# Patient Record
Sex: FEMALE | Race: WHITE
[De-identification: names, ages, dates, MRNs, and addresses within clinical notes are randomized per-mention and may not be internally consistent; named-entity substitution may affect disease eponyms.]

---

## 2023-02-06 ENCOUNTER — HOSPITAL ENCOUNTER (OUTPATIENT)
Dept: HOSPITAL 54 - WOU | Age: 83
Discharge: HOME HEALTH | End: 2023-02-06
Attending: PODIATRIST
Payer: MEDICARE

## 2023-02-06 DIAGNOSIS — L97.518: ICD-10-CM

## 2023-02-06 DIAGNOSIS — I70.235: Primary | ICD-10-CM

## 2023-02-06 DIAGNOSIS — M79.671: ICD-10-CM

## 2023-02-06 DIAGNOSIS — E11.52: ICD-10-CM

## 2023-02-06 DIAGNOSIS — I96: ICD-10-CM

## 2023-02-06 DIAGNOSIS — Z79.84: ICD-10-CM

## 2023-02-06 DIAGNOSIS — Z79.82: ICD-10-CM

## 2023-02-06 PROCEDURE — G0463 HOSPITAL OUTPT CLINIC VISIT: HCPCS

## 2023-02-13 ENCOUNTER — HOSPITAL ENCOUNTER (OUTPATIENT)
Dept: HOSPITAL 54 - WOU | Age: 83
Discharge: HOME HEALTH | End: 2023-02-13
Attending: PODIATRIST
Payer: MEDICARE

## 2023-02-13 DIAGNOSIS — Z79.84: ICD-10-CM

## 2023-02-13 DIAGNOSIS — I70.221: ICD-10-CM

## 2023-02-13 DIAGNOSIS — L97.518: ICD-10-CM

## 2023-02-13 DIAGNOSIS — I96: ICD-10-CM

## 2023-02-13 DIAGNOSIS — I70.235: Primary | ICD-10-CM

## 2023-02-13 DIAGNOSIS — E11.52: ICD-10-CM

## 2023-02-13 DIAGNOSIS — M79.671: ICD-10-CM

## 2023-02-13 PROCEDURE — G0463 HOSPITAL OUTPT CLINIC VISIT: HCPCS

## 2023-02-20 ENCOUNTER — HOSPITAL ENCOUNTER (OUTPATIENT)
Dept: HOSPITAL 54 - WOU | Age: 83
Discharge: HOME HEALTH | End: 2023-02-20
Attending: PODIATRIST
Payer: MEDICARE

## 2023-02-20 DIAGNOSIS — I70.261: ICD-10-CM

## 2023-02-20 DIAGNOSIS — L97.518: ICD-10-CM

## 2023-02-20 DIAGNOSIS — Z79.84: ICD-10-CM

## 2023-02-20 DIAGNOSIS — E11.52: Primary | ICD-10-CM

## 2023-02-20 PROCEDURE — G0463 HOSPITAL OUTPT CLINIC VISIT: HCPCS

## 2023-02-23 ENCOUNTER — HOSPITAL ENCOUNTER (OUTPATIENT)
Dept: HOSPITAL 54 - WOU | Age: 83
Discharge: HOME HEALTH | End: 2023-02-23
Attending: PODIATRIST
Payer: MEDICARE

## 2023-02-23 DIAGNOSIS — M79.671: ICD-10-CM

## 2023-02-23 DIAGNOSIS — Z79.82: ICD-10-CM

## 2023-02-23 DIAGNOSIS — L97.528: ICD-10-CM

## 2023-02-23 DIAGNOSIS — Z79.84: ICD-10-CM

## 2023-02-23 DIAGNOSIS — I70.235: ICD-10-CM

## 2023-02-23 DIAGNOSIS — E11.52: Primary | ICD-10-CM

## 2023-02-23 DIAGNOSIS — I96: ICD-10-CM

## 2023-02-23 DIAGNOSIS — I70.221: ICD-10-CM

## 2023-02-23 PROCEDURE — G0463 HOSPITAL OUTPT CLINIC VISIT: HCPCS

## 2023-02-27 ENCOUNTER — HOSPITAL ENCOUNTER (INPATIENT)
Dept: HOSPITAL 54 - ER | Age: 83
LOS: 7 days | DRG: 981 | End: 2023-03-06
Attending: NURSE PRACTITIONER | Admitting: NURSE PRACTITIONER
Payer: MEDICARE

## 2023-02-27 ENCOUNTER — HOSPITAL ENCOUNTER (OUTPATIENT)
Dept: HOSPITAL 54 - WOU | Age: 83
Discharge: HOME HEALTH | End: 2023-02-27
Attending: PODIATRIST
Payer: MEDICARE

## 2023-02-27 VITALS — DIASTOLIC BLOOD PRESSURE: 70 MMHG | SYSTOLIC BLOOD PRESSURE: 127 MMHG

## 2023-02-27 VITALS — WEIGHT: 150.25 LBS | BODY MASS INDEX: 28.37 KG/M2 | HEIGHT: 61 IN

## 2023-02-27 VITALS — DIASTOLIC BLOOD PRESSURE: 75 MMHG | SYSTOLIC BLOOD PRESSURE: 127 MMHG

## 2023-02-27 DIAGNOSIS — J96.01: ICD-10-CM

## 2023-02-27 DIAGNOSIS — Z79.899: ICD-10-CM

## 2023-02-27 DIAGNOSIS — I96: ICD-10-CM

## 2023-02-27 DIAGNOSIS — E88.09: ICD-10-CM

## 2023-02-27 DIAGNOSIS — E83.51: ICD-10-CM

## 2023-02-27 DIAGNOSIS — Z79.82: ICD-10-CM

## 2023-02-27 DIAGNOSIS — E78.00: ICD-10-CM

## 2023-02-27 DIAGNOSIS — M79.671: ICD-10-CM

## 2023-02-27 DIAGNOSIS — J90: ICD-10-CM

## 2023-02-27 DIAGNOSIS — I70.221: ICD-10-CM

## 2023-02-27 DIAGNOSIS — I50.33: ICD-10-CM

## 2023-02-27 DIAGNOSIS — Z95.5: ICD-10-CM

## 2023-02-27 DIAGNOSIS — J98.11: ICD-10-CM

## 2023-02-27 DIAGNOSIS — L97.528: ICD-10-CM

## 2023-02-27 DIAGNOSIS — Z66: ICD-10-CM

## 2023-02-27 DIAGNOSIS — Z20.822: ICD-10-CM

## 2023-02-27 DIAGNOSIS — I82.611: ICD-10-CM

## 2023-02-27 DIAGNOSIS — F09: ICD-10-CM

## 2023-02-27 DIAGNOSIS — Z53.8: ICD-10-CM

## 2023-02-27 DIAGNOSIS — E43: ICD-10-CM

## 2023-02-27 DIAGNOSIS — D64.9: ICD-10-CM

## 2023-02-27 DIAGNOSIS — I80.8: ICD-10-CM

## 2023-02-27 DIAGNOSIS — I25.10: ICD-10-CM

## 2023-02-27 DIAGNOSIS — I25.2: ICD-10-CM

## 2023-02-27 DIAGNOSIS — F03.90: ICD-10-CM

## 2023-02-27 DIAGNOSIS — I82.621: ICD-10-CM

## 2023-02-27 DIAGNOSIS — I48.91: ICD-10-CM

## 2023-02-27 DIAGNOSIS — H54.62: ICD-10-CM

## 2023-02-27 DIAGNOSIS — Z79.84: ICD-10-CM

## 2023-02-27 DIAGNOSIS — E11.52: ICD-10-CM

## 2023-02-27 DIAGNOSIS — I11.0: ICD-10-CM

## 2023-02-27 DIAGNOSIS — J15.9: Primary | ICD-10-CM

## 2023-02-27 DIAGNOSIS — E11.52: Primary | ICD-10-CM

## 2023-02-27 DIAGNOSIS — Z79.2: ICD-10-CM

## 2023-02-27 DIAGNOSIS — I70.235: ICD-10-CM

## 2023-02-27 LAB
ALBUMIN SERPL BCP-MCNC: 1.7 G/DL (ref 3.4–5)
ALP SERPL-CCNC: 108 U/L (ref 46–116)
ALT SERPL W P-5'-P-CCNC: 17 U/L (ref 12–78)
AST SERPL W P-5'-P-CCNC: 34 U/L (ref 15–37)
BASOPHILS # BLD AUTO: 0 K/UL (ref 0–0.2)
BASOPHILS NFR BLD AUTO: 0.3 % (ref 0–2)
BILIRUB DIRECT SERPL-MCNC: 0.1 MG/DL (ref 0–0.2)
BILIRUB SERPL-MCNC: 0.2 MG/DL (ref 0.2–1)
BUN SERPL-MCNC: 15 MG/DL (ref 7–18)
CALCIUM SERPL-MCNC: 7.8 MG/DL (ref 8.5–10.1)
CHLORIDE SERPL-SCNC: 106 MMOL/L (ref 98–107)
CO2 SERPL-SCNC: 28 MMOL/L (ref 21–32)
CREAT SERPL-MCNC: 0.5 MG/DL (ref 0.6–1.3)
EOSINOPHIL NFR BLD AUTO: 0.3 % (ref 0–6)
GLUCOSE SERPL-MCNC: 129 MG/DL (ref 74–106)
HCT VFR BLD AUTO: 31 % (ref 33–45)
HGB BLD-MCNC: 9.4 G/DL (ref 11.5–14.8)
LYMPHOCYTES NFR BLD AUTO: 1 K/UL (ref 0.8–4.8)
LYMPHOCYTES NFR BLD AUTO: 19.8 % (ref 20–44)
MCHC RBC AUTO-ENTMCNC: 30 G/DL (ref 31–36)
MCV RBC AUTO: 70 FL (ref 82–100)
MONOCYTES NFR BLD AUTO: 0.5 K/UL (ref 0.1–1.3)
MONOCYTES NFR BLD AUTO: 10.4 % (ref 2–12)
NEUTROPHILS # BLD AUTO: 3.5 K/UL (ref 1.8–8.9)
NEUTROPHILS NFR BLD AUTO: 69.2 % (ref 43–81)
PLATELET # BLD AUTO: 577 K/UL (ref 150–450)
POTASSIUM SERPL-SCNC: 4.3 MMOL/L (ref 3.5–5.1)
PROT SERPL-MCNC: 5.1 G/DL (ref 6.4–8.2)
RBC # BLD AUTO: 4.47 MIL/UL (ref 4–5.2)
SODIUM SERPL-SCNC: 137 MMOL/L (ref 136–145)
WBC NRBC COR # BLD AUTO: 5 K/UL (ref 4.3–11)

## 2023-02-27 PROCEDURE — 82962 GLUCOSE BLOOD TEST: CPT

## 2023-02-27 PROCEDURE — G0463 HOSPITAL OUTPT CLINIC VISIT: HCPCS

## 2023-02-27 PROCEDURE — C9803 HOPD COVID-19 SPEC COLLECT: HCPCS

## 2023-02-27 PROCEDURE — A4223 INFUSION SUPPLIES W/O PUMP: HCPCS

## 2023-02-27 PROCEDURE — A6403 STERILE GAUZE>16 <= 48 SQ IN: HCPCS

## 2023-02-27 PROCEDURE — G0378 HOSPITAL OBSERVATION PER HR: HCPCS

## 2023-02-27 RX ADMIN — MEROPENEM SCH MLS/HR: 1 INJECTION INTRAVENOUS at 16:13

## 2023-02-27 RX ADMIN — ASPIRIN 81 MG SCH MG: 81 TABLET ORAL at 13:07

## 2023-02-27 RX ADMIN — Medication SCH EACH: at 12:22

## 2023-02-27 RX ADMIN — Medication SCH EACH: at 22:00

## 2023-02-27 RX ADMIN — DEXTROSE MONOHYDRATE SCH MLS/HR: 50 INJECTION, SOLUTION INTRAVENOUS at 21:10

## 2023-02-27 RX ADMIN — INSULIN HUMAN PRN UNITS: 100 INJECTION, SOLUTION PARENTERAL at 17:50

## 2023-02-27 RX ADMIN — CARVEDILOL SCH MG: 12.5 TABLET, FILM COATED ORAL at 21:47

## 2023-02-27 RX ADMIN — Medication SCH EACH: at 16:53

## 2023-02-27 NOTE — NUR
CHANGE OF SHIFT REPORT



PATIENT RESTING COMFORTABLY IN BED. NO S/S OR C/O PAIN OR DISTRESS NOTED. SIDE RAILS UP X2, 
CALL LIGHT LEFT WITHIN REACH. PT KEPT CLEAN, DRY, AND COMFORTABLE. NO SIGNIFICANT CHANGES 
SINCE ADMISSION WILL GIVE REPORT TO RAVI RN.

## 2023-02-27 NOTE — NUR
TELERN

DUE MEDS ADMINISTERED,  AWARE IF MEDS SHE TAKES,  V/S STABLE.  ENCOURAGED TO SPACE ACTIVITY, 
OFFERED BSC, DECLINED FOR NOW.

## 2023-02-27 NOTE — NUR
TELEMETRY ADMIT FROM ER



AFTER REPORT RECEIVED FROM RONY RN. PATIENT ORIENTED TO PRIMARY RN, UNIT, ROOM, BED, AND 
UNIT POLICIES REGARDING PATIENT CARE AND VISITING HOURS. PATIENT NOW ON CONTINUOUS TELEMETRY 
MONITORING. READING ON ARRIVAL IS AFIB 79. PATIENT PLACED ON BEDSIDE OXYGEN, WEIGHED BY 
BEDSCALE AND ENCOURAGED TO CALL IF THEY NEED SOMETHING. ALL QUESTIONS AND CONCERNS 
ADDRESSED, PATIENT VERBALIZED UNDERSTANDING.

## 2023-02-27 NOTE — NUR
TELERN

AWAKE, RESTING UNDERSTANDS BASIC ENGLISH.  NO SOB, O2 MAINTAINED  ALL NEEDS ATTENDED.  A FIB 
ON THE MONITOR.  SAFETY PRECAUTIONS EMPHASIZED PLAN OF CARE.  APPEARS TO UNDERSTAND.  
CONTINUED MONITORING

## 2023-02-27 NOTE — NUR
REceived pt 83 yrs female transfer from wound care center for evaliation on 
gangarine on rt big toe for 3 month and sob for 3 days and for medcale 
clearance for surger

## 2023-02-28 VITALS — DIASTOLIC BLOOD PRESSURE: 61 MMHG | SYSTOLIC BLOOD PRESSURE: 125 MMHG

## 2023-02-28 VITALS — SYSTOLIC BLOOD PRESSURE: 106 MMHG | DIASTOLIC BLOOD PRESSURE: 60 MMHG

## 2023-02-28 VITALS — SYSTOLIC BLOOD PRESSURE: 129 MMHG | DIASTOLIC BLOOD PRESSURE: 63 MMHG

## 2023-02-28 VITALS — SYSTOLIC BLOOD PRESSURE: 128 MMHG | DIASTOLIC BLOOD PRESSURE: 67 MMHG

## 2023-02-28 VITALS — DIASTOLIC BLOOD PRESSURE: 72 MMHG | SYSTOLIC BLOOD PRESSURE: 111 MMHG

## 2023-02-28 LAB
BASOPHILS # BLD AUTO: 0 K/UL (ref 0–0.2)
BASOPHILS NFR BLD AUTO: 1.1 % (ref 0–2)
BUN SERPL-MCNC: 11 MG/DL (ref 7–18)
CALCIUM SERPL-MCNC: 7.7 MG/DL (ref 8.5–10.1)
CHLORIDE SERPL-SCNC: 105 MMOL/L (ref 98–107)
CHOLEST SERPL-MCNC: 124 MG/DL (ref ?–200)
CO2 SERPL-SCNC: 28 MMOL/L (ref 21–32)
CREAT SERPL-MCNC: 0.5 MG/DL (ref 0.6–1.3)
EOSINOPHIL NFR BLD AUTO: 0.5 % (ref 0–6)
FERRITIN SERPL-MCNC: 59 NG/ML (ref 8–388)
GLUCOSE SERPL-MCNC: 97 MG/DL (ref 74–106)
HCT VFR BLD AUTO: 29 % (ref 33–45)
HDLC SERPL-MCNC: 37 MG/DL (ref 40–60)
HGB BLD-MCNC: 8.8 G/DL (ref 11.5–14.8)
IRON SERPL-MCNC: 10 UG/DL (ref 50–175)
LDLC SERPL DIRECT ASSAY-MCNC: 70 MG/DL (ref 0–99)
LYMPHOCYTES NFR BLD AUTO: 0.8 K/UL (ref 0.8–4.8)
LYMPHOCYTES NFR BLD AUTO: 17.8 % (ref 20–44)
MAGNESIUM SERPL-MCNC: 2.2 MG/DL (ref 1.8–2.4)
MCHC RBC AUTO-ENTMCNC: 30 G/DL (ref 31–36)
MCV RBC AUTO: 71 FL (ref 82–100)
MONOCYTES NFR BLD AUTO: 0.5 K/UL (ref 0.1–1.3)
MONOCYTES NFR BLD AUTO: 11 % (ref 2–12)
NEUTROPHILS # BLD AUTO: 3.1 K/UL (ref 1.8–8.9)
NEUTROPHILS NFR BLD AUTO: 69.6 % (ref 43–81)
PHOSPHATE SERPL-MCNC: 3.1 MG/DL (ref 2.5–4.9)
PLATELET # BLD AUTO: 576 K/UL (ref 150–450)
POTASSIUM SERPL-SCNC: 4.1 MMOL/L (ref 3.5–5.1)
RBC # BLD AUTO: 4.16 MIL/UL (ref 4–5.2)
SODIUM SERPL-SCNC: 137 MMOL/L (ref 136–145)
TIBC SERPL-MCNC: 286 UG/DL (ref 250–450)
TRIGL SERPL-MCNC: 84 MG/DL (ref 30–150)
TSH SERPL DL<=0.005 MIU/L-ACNC: 2.09 UIU/ML (ref 0.36–3.74)
WBC NRBC COR # BLD AUTO: 4.5 K/UL (ref 4.3–11)

## 2023-02-28 PROCEDURE — 05HB33Z INSERTION OF INFUSION DEVICE INTO RIGHT BASILIC VEIN, PERCUTANEOUS APPROACH: ICD-10-PCS | Performed by: NURSE PRACTITIONER

## 2023-02-28 RX ADMIN — POTASSIUM CHLORIDE SCH MEQ: 1500 TABLET, EXTENDED RELEASE ORAL at 11:00

## 2023-02-28 RX ADMIN — Medication SCH EACH: at 05:51

## 2023-02-28 RX ADMIN — INSULIN HUMAN PRN UNITS: 100 INJECTION, SOLUTION PARENTERAL at 21:35

## 2023-02-28 RX ADMIN — CEFEPIME HYDROCHLORIDE SCH MLS/HR: 1 INJECTION, POWDER, FOR SOLUTION INTRAMUSCULAR; INTRAVENOUS at 21:56

## 2023-02-28 RX ADMIN — Medication SCH EACH: at 12:28

## 2023-02-28 RX ADMIN — MEROPENEM SCH MLS/HR: 1 INJECTION INTRAVENOUS at 04:51

## 2023-02-28 RX ADMIN — FUROSEMIDE SCH MG: 10 INJECTION, SOLUTION INTRAMUSCULAR; INTRAVENOUS at 17:45

## 2023-02-28 RX ADMIN — POTASSIUM CHLORIDE SCH MEQ: 1500 TABLET, EXTENDED RELEASE ORAL at 09:15

## 2023-02-28 RX ADMIN — CARVEDILOL SCH MG: 12.5 TABLET, FILM COATED ORAL at 21:37

## 2023-02-28 RX ADMIN — Medication SCH EACH: at 16:59

## 2023-02-28 RX ADMIN — CEFEPIME HYDROCHLORIDE SCH MLS/HR: 1 INJECTION, POWDER, FOR SOLUTION INTRAMUSCULAR; INTRAVENOUS at 14:11

## 2023-02-28 RX ADMIN — FUROSEMIDE SCH MG: 10 INJECTION, SOLUTION INTRAMUSCULAR; INTRAVENOUS at 14:10

## 2023-02-28 RX ADMIN — FUROSEMIDE SCH MG: 10 INJECTION, SOLUTION INTRAMUSCULAR; INTRAVENOUS at 14:13

## 2023-02-28 RX ADMIN — Medication SCH EACH: at 21:33

## 2023-02-28 RX ADMIN — CEFEPIME HYDROCHLORIDE SCH MLS/HR: 1 INJECTION, POWDER, FOR SOLUTION INTRAMUSCULAR; INTRAVENOUS at 14:13

## 2023-02-28 RX ADMIN — FUROSEMIDE SCH MG: 10 INJECTION, SOLUTION INTRAMUSCULAR; INTRAVENOUS at 09:14

## 2023-02-28 RX ADMIN — EMPAGLIFLOZIN SCH MG: 25 TABLET, FILM COATED ORAL at 09:15

## 2023-02-28 RX ADMIN — DEXTROSE MONOHYDRATE SCH MLS/HR: 50 INJECTION, SOLUTION INTRAVENOUS at 20:19

## 2023-02-28 RX ADMIN — CARVEDILOL SCH MG: 12.5 TABLET, FILM COATED ORAL at 09:15

## 2023-02-28 RX ADMIN — POTASSIUM CHLORIDE SCH MEQ: 1500 TABLET, EXTENDED RELEASE ORAL at 10:26

## 2023-02-28 NOTE — NUR
TELERN

RIGHT ARM SWOLLEN WARM AND RED, ELEVATED, COLD AND WARM COMPRESS, MONITORED.  INFORMED ON 
CALL NP.  FOR VENOUS DUPLEX TODAY.

## 2023-02-28 NOTE — NUR
RN OPENING NOTE



PATIENT AWAKE IN BED RESTING, A/O X 3. NO S/S OF PAIN NOTED AT THIS TIME. ON 2L OXYGEN VIA 
NC, BREATHING EVEN UNLABORED, NO DISTRESS OR SHORTNESS OF BREATH NOTED AT THIS TIME. IV 
ACCESS SHABANA PICC-LINE, INTACT PATENT AND FLUSHING WELL. PATIENT WITH EXTERNAL CARDIAC MONITOR 
WITH CURRENT READING OF A-FIB AND HR OF 86, NO CARDIAC DISTRESS NOTED. FALL AND SAFETY 
MEASURES IN PLACE, BED ALARM ON, BED IN LOW AND LOCK POSITION, CALL LIGHT AND TABLE WITHIN 
EASY REACH, SIDE RAILS UP X2. WILL CONTINUE TO MONITOR.

## 2023-02-28 NOTE — NUR
WOUND CARE CONSULT: PT EATING BREAKFAST AT THIS TIME. PT IS AMBULATORY WITH ASSISTANCE AND 
CONTINENT. DR MEZA NOTIFIED OF PT ROOM NUMBER AND ADMISSION. MD IN AGREEMENT WITH PLAN 
OF CARE.

## 2023-02-28 NOTE — NUR
RN CLOSING NOTE



PATIENT AWAKE IN BED RESTING, A/O X 3. NO S/S OF PAIN NOTED AT THIS TIME. ON 2L OXYGEN VIA 
NC, BREATHING EVEN UNLABORED, NO DISTRESS OR SHORTNESS OF BREATH NOTED AT THIS TIME. IV 
ACCESS SHABANA PICC-LINE, INTACT PATENT AND FLUSHING WELL. PATIENT WITH EXTERNAL CARDIAC MONITOR 
WITH CURRENT READING OF A-FIB AND HR OF 94, NO CARDIAC DISTRESS NOTED. SCHEDULE MEDICATIONS 
ADMINISTERED. WOUND CARE IMPLEMENTED. FALL AND SAFETY MEASURES IN PLACE, BED ALARM ON, BED 
IN LOW AND LOCK POSITION, CALL LIGHT AND TABLE WITHIN EASY REACH, SIDE RAILS UP X2. ALL 
NEEDS ATTENDED AND ANTICIPATED. WILL ENDORSE TO NIGHT SHIFT NURSE.

## 2023-02-28 NOTE — NUR
TELERN

ASSISTED TO RESTROOM, VOIDED 500 CC , SOB ON MIN EXERTION.  REMAINS AFIB CONTROLLED RATE OF 
89

.CONTINUED MONITORING

## 2023-03-01 VITALS — DIASTOLIC BLOOD PRESSURE: 54 MMHG | SYSTOLIC BLOOD PRESSURE: 108 MMHG

## 2023-03-01 VITALS — DIASTOLIC BLOOD PRESSURE: 75 MMHG | SYSTOLIC BLOOD PRESSURE: 99 MMHG

## 2023-03-01 VITALS — SYSTOLIC BLOOD PRESSURE: 110 MMHG | DIASTOLIC BLOOD PRESSURE: 60 MMHG

## 2023-03-01 VITALS — SYSTOLIC BLOOD PRESSURE: 98 MMHG | DIASTOLIC BLOOD PRESSURE: 56 MMHG

## 2023-03-01 VITALS — SYSTOLIC BLOOD PRESSURE: 110 MMHG | DIASTOLIC BLOOD PRESSURE: 51 MMHG

## 2023-03-01 LAB
ALBUMIN SERPL BCP-MCNC: 1.5 G/DL (ref 3.4–5)
ALP SERPL-CCNC: 89 U/L (ref 46–116)
ALT SERPL W P-5'-P-CCNC: 12 U/L (ref 12–78)
AST SERPL W P-5'-P-CCNC: 12 U/L (ref 15–37)
BASOPHILS # BLD AUTO: 0 K/UL (ref 0–0.2)
BASOPHILS NFR BLD AUTO: 0.5 % (ref 0–2)
BILIRUB SERPL-MCNC: 0.2 MG/DL (ref 0.2–1)
BUN SERPL-MCNC: 11 MG/DL (ref 7–18)
CALCIUM SERPL-MCNC: 7.6 MG/DL (ref 8.5–10.1)
CHLORIDE SERPL-SCNC: 103 MMOL/L (ref 98–107)
CO2 SERPL-SCNC: 34 MMOL/L (ref 21–32)
CREAT SERPL-MCNC: 0.5 MG/DL (ref 0.6–1.3)
EOSINOPHIL NFR BLD AUTO: 0.8 % (ref 0–6)
GLUCOSE SERPL-MCNC: 104 MG/DL (ref 74–106)
HCT VFR BLD AUTO: 28 % (ref 33–45)
HGB BLD-MCNC: 8.6 G/DL (ref 11.5–14.8)
LYMPHOCYTES NFR BLD AUTO: 0.8 K/UL (ref 0.8–4.8)
LYMPHOCYTES NFR BLD AUTO: 17.5 % (ref 20–44)
LYMPHOCYTES NFR BLD MANUAL: 19 % (ref 16–48)
MAGNESIUM SERPL-MCNC: 2 MG/DL (ref 1.8–2.4)
MCHC RBC AUTO-ENTMCNC: 31 G/DL (ref 31–36)
MCV RBC AUTO: 69 FL (ref 82–100)
MONOCYTES NFR BLD AUTO: 0.6 K/UL (ref 0.1–1.3)
MONOCYTES NFR BLD AUTO: 13.4 % (ref 2–12)
MONOCYTES NFR BLD MANUAL: 13 % (ref 0–11)
NEUTROPHILS # BLD AUTO: 3 K/UL (ref 1.8–8.9)
NEUTROPHILS NFR BLD AUTO: 67.8 % (ref 43–81)
NEUTS SEG NFR BLD MANUAL: 68 % (ref 42–76)
PHOSPHATE SERPL-MCNC: 3.2 MG/DL (ref 2.5–4.9)
PLATELET # BLD AUTO: 540 K/UL (ref 150–450)
POTASSIUM SERPL-SCNC: 3.6 MMOL/L (ref 3.5–5.1)
PROT SERPL-MCNC: 4.9 G/DL (ref 6.4–8.2)
RBC # BLD AUTO: 4.05 MIL/UL (ref 4–5.2)
SODIUM SERPL-SCNC: 140 MMOL/L (ref 136–145)
WBC NRBC COR # BLD AUTO: 4.4 K/UL (ref 4.3–11)

## 2023-03-01 PROCEDURE — 0W9B3ZX DRAINAGE OF LEFT PLEURAL CAVITY, PERCUTANEOUS APPROACH, DIAGNOSTIC: ICD-10-PCS

## 2023-03-01 RX ADMIN — DEXTROSE MONOHYDRATE SCH MLS/HR: 50 INJECTION, SOLUTION INTRAVENOUS at 20:05

## 2023-03-01 RX ADMIN — INSULIN HUMAN PRN UNITS: 100 INJECTION, SOLUTION PARENTERAL at 22:47

## 2023-03-01 RX ADMIN — CARVEDILOL SCH MG: 12.5 TABLET, FILM COATED ORAL at 22:14

## 2023-03-01 RX ADMIN — Medication SCH EACH: at 06:58

## 2023-03-01 RX ADMIN — CARVEDILOL SCH MG: 12.5 TABLET, FILM COATED ORAL at 21:00

## 2023-03-01 RX ADMIN — CEFEPIME HYDROCHLORIDE SCH MLS/HR: 1 INJECTION, POWDER, FOR SOLUTION INTRAMUSCULAR; INTRAVENOUS at 13:42

## 2023-03-01 RX ADMIN — ZOLPIDEM TARTRATE PRN MG: 5 TABLET, FILM COATED ORAL at 22:19

## 2023-03-01 RX ADMIN — ASPIRIN 81 MG SCH MG: 81 TABLET ORAL at 11:30

## 2023-03-01 RX ADMIN — EMPAGLIFLOZIN SCH MG: 25 TABLET, FILM COATED ORAL at 08:46

## 2023-03-01 RX ADMIN — CEFEPIME HYDROCHLORIDE SCH MLS/HR: 1 INJECTION, POWDER, FOR SOLUTION INTRAMUSCULAR; INTRAVENOUS at 05:27

## 2023-03-01 RX ADMIN — Medication SCH EACH: at 12:35

## 2023-03-01 RX ADMIN — CARVEDILOL SCH MG: 12.5 TABLET, FILM COATED ORAL at 08:46

## 2023-03-01 RX ADMIN — Medication SCH EACH: at 22:14

## 2023-03-01 RX ADMIN — CARVEDILOL SCH MG: 12.5 TABLET, FILM COATED ORAL at 08:47

## 2023-03-01 RX ADMIN — CEFEPIME HYDROCHLORIDE SCH MLS/HR: 1 INJECTION, POWDER, FOR SOLUTION INTRAMUSCULAR; INTRAVENOUS at 21:48

## 2023-03-01 RX ADMIN — Medication SCH EACH: at 18:08

## 2023-03-01 RX ADMIN — INSULIN HUMAN PRN UNITS: 100 INJECTION, SOLUTION PARENTERAL at 06:58

## 2023-03-01 NOTE — NUR
RN NOTES- AMBIEN GIVEN



PATIENT ASKED FOR SLEEPING PILL INSTEAD OF PAIN PILL. AMBIEN 5MG GIVEN PRN AS PER DR. SHOEMAKER. PATIENT VERBALIZED THAT PAIN PILLS CAUSE BAD SIDE EFFECTS ON HER. WILL CONTINUE TO 
MONITOR PATIENT.

## 2023-03-01 NOTE — NUR
RN NOTE



PATIENT THORACENTESIS WAS DONE TODAY, PATIENT TOLERATED PROCEDURE WELL, PATIENT V/S WERE 
TAKEN, STABLE, CHEST X-RAY ORDERED, PATIENT IS RESTING IN ROOM COMFORTABLY.

## 2023-03-01 NOTE — NUR
RN OPENING NOTE



PATIENT AWAKE IN BED RESTING, A/O X 3. NO S/S OF PAIN NOTED AT THIS TIME. ON 2L OXYGEN VIA 
NC, BREATHING EVEN UNLABORED, NO DISTRESS OR SHORTNESS OF BREATH NOTED AT THIS TIME. IV 
ACCESS SHANNAN MIDLINE, INTACT PATENT AND FLUSHING WELL. PATIENT WITH EXTERNAL CARDIAC MONITOR 
WITH CURRENT READING OF A-FIB AND HR OF 86, NO CARDIAC DISTRESS NOTED. FALL AND SAFETY 
MEASURES IN PLACE, BED ALARM ON, BED IN LOW AND LOCK POSITION, CALL LIGHT AND TABLE WITHIN 
EASY REACH, SIDE RAILS UP X2. WILL CONTINUE TO MONITOR.

## 2023-03-01 NOTE — NUR
TELE RN OPENING NOTES



RECEIVED PATIENT AWAKE IN BED RESTING, A/O X 3. NO S/S OF PAIN NOTED AT THIS TIME. ON 2L 
OXYGEN VIA NC, BREATHING EVEN UNLABORED, NO DISTRESS OR SHORTNESS OF BREATH NOTED AT THIS 
TIME. IV ACCESS SHANNAN MIDLINE, INTACT PATENT AND FLUSHING WELL. PATIENT WITH EXTERNAL CARDIAC 
MONITOR WITH CURRENT READING OF A-FIB AND HR OF 93, NO CARDIAC DISTRESS NOTED. FALL AND 
SAFETY MEASURES IN PLACE, BED ALARM ON, BED IN LOW AND LOCK POSITION, CALL LIGHT AND TABLE 
WITHIN EASY REACH, SIDE RAILS UP X2. WILL CONTINUE TO MONITOR.

## 2023-03-01 NOTE — NUR
END OF SHIFT REPORT

Patient in bed, Alert Oriented x3. Oxygen sat high 90's in 2L NC. Ambulating to the bathroom 
FWW, denies sob with exertion. SHABANA Midline intact, On IV abx. Afebrile during the shift. 
Right toe gangrene, dressing C/D/I. Fall precaution maintained. Plan for US Thoracentesis, 
Right great toe amputation. Will endorse to oncoming RN. 

-------------------------------------------------------------------------------

Addendum: 03/01/23 at 0620 by KHANH BUSTOS RN

-------------------------------------------------------------------------------

CONTINUE NOTES BELOW

AFib controlled in the Tele monitor HR 80's. Patient denies chest pain.

## 2023-03-02 VITALS — SYSTOLIC BLOOD PRESSURE: 92 MMHG | DIASTOLIC BLOOD PRESSURE: 48 MMHG

## 2023-03-02 VITALS — DIASTOLIC BLOOD PRESSURE: 61 MMHG | SYSTOLIC BLOOD PRESSURE: 101 MMHG

## 2023-03-02 VITALS — SYSTOLIC BLOOD PRESSURE: 120 MMHG | DIASTOLIC BLOOD PRESSURE: 73 MMHG

## 2023-03-02 VITALS — DIASTOLIC BLOOD PRESSURE: 41 MMHG | SYSTOLIC BLOOD PRESSURE: 109 MMHG

## 2023-03-02 VITALS — SYSTOLIC BLOOD PRESSURE: 97 MMHG | DIASTOLIC BLOOD PRESSURE: 62 MMHG

## 2023-03-02 VITALS — DIASTOLIC BLOOD PRESSURE: 50 MMHG | SYSTOLIC BLOOD PRESSURE: 117 MMHG

## 2023-03-02 VITALS — SYSTOLIC BLOOD PRESSURE: 137 MMHG | DIASTOLIC BLOOD PRESSURE: 50 MMHG

## 2023-03-02 VITALS — SYSTOLIC BLOOD PRESSURE: 110 MMHG | DIASTOLIC BLOOD PRESSURE: 55 MMHG

## 2023-03-02 VITALS — DIASTOLIC BLOOD PRESSURE: 63 MMHG | SYSTOLIC BLOOD PRESSURE: 118 MMHG

## 2023-03-02 VITALS — DIASTOLIC BLOOD PRESSURE: 73 MMHG | SYSTOLIC BLOOD PRESSURE: 114 MMHG

## 2023-03-02 VITALS — DIASTOLIC BLOOD PRESSURE: 51 MMHG | SYSTOLIC BLOOD PRESSURE: 119 MMHG

## 2023-03-02 VITALS — DIASTOLIC BLOOD PRESSURE: 40 MMHG | SYSTOLIC BLOOD PRESSURE: 100 MMHG

## 2023-03-02 VITALS — DIASTOLIC BLOOD PRESSURE: 58 MMHG | SYSTOLIC BLOOD PRESSURE: 99 MMHG

## 2023-03-02 VITALS — SYSTOLIC BLOOD PRESSURE: 132 MMHG | DIASTOLIC BLOOD PRESSURE: 64 MMHG

## 2023-03-02 VITALS — SYSTOLIC BLOOD PRESSURE: 109 MMHG | DIASTOLIC BLOOD PRESSURE: 50 MMHG

## 2023-03-02 VITALS — DIASTOLIC BLOOD PRESSURE: 52 MMHG | SYSTOLIC BLOOD PRESSURE: 110 MMHG

## 2023-03-02 VITALS — DIASTOLIC BLOOD PRESSURE: 54 MMHG | SYSTOLIC BLOOD PRESSURE: 94 MMHG

## 2023-03-02 VITALS — SYSTOLIC BLOOD PRESSURE: 100 MMHG | DIASTOLIC BLOOD PRESSURE: 75 MMHG

## 2023-03-02 VITALS — SYSTOLIC BLOOD PRESSURE: 117 MMHG | DIASTOLIC BLOOD PRESSURE: 65 MMHG

## 2023-03-02 VITALS — SYSTOLIC BLOOD PRESSURE: 124 MMHG | DIASTOLIC BLOOD PRESSURE: 53 MMHG

## 2023-03-02 VITALS — SYSTOLIC BLOOD PRESSURE: 98 MMHG | DIASTOLIC BLOOD PRESSURE: 43 MMHG

## 2023-03-02 VITALS — SYSTOLIC BLOOD PRESSURE: 109 MMHG | DIASTOLIC BLOOD PRESSURE: 42 MMHG

## 2023-03-02 VITALS — DIASTOLIC BLOOD PRESSURE: 75 MMHG | SYSTOLIC BLOOD PRESSURE: 113 MMHG

## 2023-03-02 VITALS — DIASTOLIC BLOOD PRESSURE: 57 MMHG | SYSTOLIC BLOOD PRESSURE: 114 MMHG

## 2023-03-02 VITALS — SYSTOLIC BLOOD PRESSURE: 99 MMHG | DIASTOLIC BLOOD PRESSURE: 69 MMHG

## 2023-03-02 VITALS — DIASTOLIC BLOOD PRESSURE: 53 MMHG | SYSTOLIC BLOOD PRESSURE: 125 MMHG

## 2023-03-02 VITALS — DIASTOLIC BLOOD PRESSURE: 43 MMHG | SYSTOLIC BLOOD PRESSURE: 96 MMHG

## 2023-03-02 VITALS — SYSTOLIC BLOOD PRESSURE: 102 MMHG | DIASTOLIC BLOOD PRESSURE: 52 MMHG

## 2023-03-02 VITALS — DIASTOLIC BLOOD PRESSURE: 42 MMHG | SYSTOLIC BLOOD PRESSURE: 99 MMHG

## 2023-03-02 VITALS — SYSTOLIC BLOOD PRESSURE: 110 MMHG | DIASTOLIC BLOOD PRESSURE: 66 MMHG

## 2023-03-02 VITALS — SYSTOLIC BLOOD PRESSURE: 112 MMHG | DIASTOLIC BLOOD PRESSURE: 58 MMHG

## 2023-03-02 VITALS — SYSTOLIC BLOOD PRESSURE: 99 MMHG | DIASTOLIC BLOOD PRESSURE: 43 MMHG

## 2023-03-02 VITALS — SYSTOLIC BLOOD PRESSURE: 102 MMHG | DIASTOLIC BLOOD PRESSURE: 54 MMHG

## 2023-03-02 VITALS — SYSTOLIC BLOOD PRESSURE: 136 MMHG | DIASTOLIC BLOOD PRESSURE: 56 MMHG

## 2023-03-02 VITALS — DIASTOLIC BLOOD PRESSURE: 68 MMHG | SYSTOLIC BLOOD PRESSURE: 111 MMHG

## 2023-03-02 LAB
BASE EXCESS BLDA CALC-SCNC: 2.8 MMOL/L
BASOPHILS # BLD AUTO: 0 K/UL (ref 0–0.2)
BASOPHILS NFR BLD AUTO: 0.6 % (ref 0–2)
BUN SERPL-MCNC: 10 MG/DL (ref 7–18)
CALCIUM SERPL-MCNC: 7.5 MG/DL (ref 8.5–10.1)
CHLORIDE SERPL-SCNC: 102 MMOL/L (ref 98–107)
CO2 SERPL-SCNC: 33 MMOL/L (ref 21–32)
CREAT SERPL-MCNC: 0.5 MG/DL (ref 0.6–1.3)
DO-HGB MFR BLDA: 73.3 MMHG
EOSINOPHIL NFR BLD AUTO: 1.2 % (ref 0–6)
EOSINOPHIL NFR BLD MANUAL: 3 % (ref 0–4)
GLUCOSE SERPL-MCNC: 110 MG/DL (ref 74–106)
HCT VFR BLD AUTO: 26 % (ref 33–45)
HGB BLD-MCNC: 8.1 G/DL (ref 11.5–14.8)
INHALED O2 CONCENTRATION: 30 %
INHALED O2 FLOW RATE: 2.5 L/MIN (ref 0–30)
LYMPHOCYTES NFR BLD AUTO: 0.9 K/UL (ref 0.8–4.8)
LYMPHOCYTES NFR BLD AUTO: 19.4 % (ref 20–44)
LYMPHOCYTES NFR BLD MANUAL: 17 % (ref 16–48)
MCHC RBC AUTO-ENTMCNC: 31 G/DL (ref 31–36)
MCV RBC AUTO: 69 FL (ref 82–100)
MONOCYTES NFR BLD AUTO: 0.5 K/UL (ref 0.1–1.3)
MONOCYTES NFR BLD AUTO: 12.2 % (ref 2–12)
MONOCYTES NFR BLD MANUAL: 11 % (ref 0–11)
NEUTROPHILS # BLD AUTO: 3 K/UL (ref 1.8–8.9)
NEUTROPHILS NFR BLD AUTO: 66.6 % (ref 43–81)
NEUTS SEG NFR BLD MANUAL: 69 % (ref 42–76)
PCO2 TEMP ADJ BLDA: 43.5 MMHG (ref 35–45)
PH TEMP ADJ BLDA: 7.42 [PH] (ref 7.35–7.45)
PLATELET # BLD AUTO: 509 K/UL (ref 150–450)
PO2 TEMP ADJ BLDA: 89.5 MMHG (ref 75–100)
POTASSIUM SERPL-SCNC: 3.7 MMOL/L (ref 3.5–5.1)
RBC # BLD AUTO: 3.84 MIL/UL (ref 4–5.2)
SAO2 % BLDA: 95.5 % (ref 92–98.5)
SODIUM SERPL-SCNC: 138 MMOL/L (ref 136–145)
VENTILATION MODE VENT: (no result)
WBC NRBC COR # BLD AUTO: 4.5 K/UL (ref 4.3–11)

## 2023-03-02 RX ADMIN — Medication SCH EACH: at 17:33

## 2023-03-02 RX ADMIN — CEFEPIME HYDROCHLORIDE SCH MLS/HR: 1 INJECTION, POWDER, FOR SOLUTION INTRAMUSCULAR; INTRAVENOUS at 04:12

## 2023-03-02 RX ADMIN — Medication SCH EACH: at 06:39

## 2023-03-02 RX ADMIN — CARVEDILOL SCH MG: 12.5 TABLET, FILM COATED ORAL at 09:00

## 2023-03-02 RX ADMIN — Medication SCH EACH: at 21:11

## 2023-03-02 RX ADMIN — INSULIN HUMAN PRN UNITS: 100 INJECTION, SOLUTION PARENTERAL at 06:40

## 2023-03-02 RX ADMIN — INSULIN HUMAN PRN UNITS: 100 INJECTION, SOLUTION PARENTERAL at 17:39

## 2023-03-02 RX ADMIN — CARVEDILOL SCH MG: 12.5 TABLET, FILM COATED ORAL at 21:00

## 2023-03-02 RX ADMIN — EMPAGLIFLOZIN SCH MG: 25 TABLET, FILM COATED ORAL at 09:00

## 2023-03-02 RX ADMIN — ZOLPIDEM TARTRATE PRN MG: 5 TABLET, FILM COATED ORAL at 21:10

## 2023-03-02 RX ADMIN — CARVEDILOL SCH MG: 12.5 TABLET, FILM COATED ORAL at 21:02

## 2023-03-02 RX ADMIN — Medication SCH EACH: at 12:29

## 2023-03-02 NOTE — NUR
RN NOTES



PATIENT FOR RIGHT GREAT TOE AMPUTATION, CONSENTS SIGNED. NPO SINCE MIDNIGHT. V/S STABLE AND 
BLOOD SUGAR LEVEL . DAUGHTER WAS INFORMED LAST NIGHT. WILL ENDORSE TO THE NEXT SHIFT.

## 2023-03-02 NOTE — NUR
RN NOTES 

RECEIVED PT FROM OR FOR CLOSE MONITORING , PT IS DRAWZY , FOLLOWS SIMPLE COMMAND, ON 2L O2 
N/C , O2 SAT WNL, ON TELE SR HR IN 80'S , IV SITE CDI, SR UP x3, CALL LIGHT WITHIN EASY 
REACH, BED LOCKED AND IN LOWEST POSITION, CONTINUE TO MONITOR

## 2023-03-02 NOTE — NUR
RN NOTE



Patient in bed, on high lnog's, AO x 3, in no acute distress, breathing unlabored, Afib on 
the monitor, HR is 96. SHANNAN midline patent and flushing well, saline locked. Wound dressing 
at R big toe clean, dry and intact. Safety measures in place, bed is locked and at lowest 
position, HOB elevated, bed alarm on, call light within reach of patient. Will continue to 
monitor and reassess.

## 2023-03-02 NOTE — NUR
RN NOTES 

PT VERY SENSETIVE TO HAVE BP CUFF ON HER ARM , REFUSED TO HAVE IT ON THE ARMS , BP CUFF 
PLACED ON LEFT CALF .

## 2023-03-02 NOTE — NUR
TELE RN OPENING NOTES



RECEIVED PATIENT AWAKE IN BED, A/Ox3-4, ABLE TO MAKE NEEDS KNOWN, L EYE BLINDNESS. ON 2L O2 
VIA NC, NO S/S OF RESPIRATORY DISTRESS. ON TELE MONITORING SHOWING SINUS RHYTHM HR 96. IV 
ACCESS SHANNAN MIDLINE S/L. INTACT AND PATENT. PATIENT CONTINENT WITH BRP, USES FWW. PATIENT 
TAKEN DOWN FOR SURGERY @0705, ACCOMPANIED BY TWO OR STAFF. WILL WAIT FOR PATIENT TO RETURN.

## 2023-03-02 NOTE — NUR
TELE RN CLOSING NOTES



PATIENT AWAKE AND ALERT. A/O X 4. ON RA, BREATHING EVEN UNLABORED, NO DISTRESS OR SHORTNESS 
OF BREATH NOTED AT THIS TIME. IV ACCESS SHANNAN MIDLINE, INTACT PATENT AND FLUSHING WELL. 
PATIENT WITH EXTERNAL CARDIAC MONITOR WITH CURRENT READING OF A-FIB AND HR OF 98, NO CARDIAC 
DISTRESS NOTED. ALL DUE IV MEDS GIVEN. NPO SINCE MIDNIGHT. FALL AND SAFETY MEASURES 
MAINTAINED, BED ALARM ON, BED IN LOW AND LOCK POSITION, CALL LIGHT AND TABLE WITHIN EASY 
REACH, SIDE RAILS UP X2. WILL ENDORSE TO THE NEXT SHIFT.

## 2023-03-02 NOTE — NUR
RN NOTES 

PT AWAKE AND ALERT , VSS STABLE, SON AT THE BEDSIDE, NO DISTRESS NOTED, NO SIGNFICANT 
CHANGES NOTED , WILL ENDORSE TO NIGHT SHIFT NURSE FOR CONTINUITY OF CARE

## 2023-03-02 NOTE — NUR
RN NOTES 

PT VOIDED 30 CC URINE OUTPUT , C/O PAIN ON HER ABD, ORTIZ INSERTED PER NP ORDER ,  350 CC 
YELLOW CLEAR ,  URINE OUT PUT DRAINED TO THE BAG, NP NOTIFIED

## 2023-03-03 VITALS — SYSTOLIC BLOOD PRESSURE: 120 MMHG | DIASTOLIC BLOOD PRESSURE: 74 MMHG

## 2023-03-03 VITALS — DIASTOLIC BLOOD PRESSURE: 62 MMHG | SYSTOLIC BLOOD PRESSURE: 125 MMHG

## 2023-03-03 VITALS — SYSTOLIC BLOOD PRESSURE: 113 MMHG | DIASTOLIC BLOOD PRESSURE: 62 MMHG

## 2023-03-03 VITALS — SYSTOLIC BLOOD PRESSURE: 111 MMHG | DIASTOLIC BLOOD PRESSURE: 47 MMHG

## 2023-03-03 VITALS — DIASTOLIC BLOOD PRESSURE: 52 MMHG | SYSTOLIC BLOOD PRESSURE: 105 MMHG

## 2023-03-03 VITALS — SYSTOLIC BLOOD PRESSURE: 90 MMHG | DIASTOLIC BLOOD PRESSURE: 40 MMHG

## 2023-03-03 VITALS — DIASTOLIC BLOOD PRESSURE: 74 MMHG | SYSTOLIC BLOOD PRESSURE: 119 MMHG

## 2023-03-03 VITALS — SYSTOLIC BLOOD PRESSURE: 146 MMHG | DIASTOLIC BLOOD PRESSURE: 70 MMHG

## 2023-03-03 VITALS — SYSTOLIC BLOOD PRESSURE: 118 MMHG | DIASTOLIC BLOOD PRESSURE: 95 MMHG

## 2023-03-03 VITALS — DIASTOLIC BLOOD PRESSURE: 52 MMHG | SYSTOLIC BLOOD PRESSURE: 110 MMHG

## 2023-03-03 VITALS — SYSTOLIC BLOOD PRESSURE: 115 MMHG | DIASTOLIC BLOOD PRESSURE: 64 MMHG

## 2023-03-03 VITALS — SYSTOLIC BLOOD PRESSURE: 114 MMHG | DIASTOLIC BLOOD PRESSURE: 68 MMHG

## 2023-03-03 VITALS — SYSTOLIC BLOOD PRESSURE: 114 MMHG | DIASTOLIC BLOOD PRESSURE: 65 MMHG

## 2023-03-03 VITALS — SYSTOLIC BLOOD PRESSURE: 156 MMHG | DIASTOLIC BLOOD PRESSURE: 51 MMHG

## 2023-03-03 VITALS — SYSTOLIC BLOOD PRESSURE: 158 MMHG | DIASTOLIC BLOOD PRESSURE: 112 MMHG

## 2023-03-03 VITALS — DIASTOLIC BLOOD PRESSURE: 54 MMHG | SYSTOLIC BLOOD PRESSURE: 104 MMHG

## 2023-03-03 VITALS — DIASTOLIC BLOOD PRESSURE: 78 MMHG | SYSTOLIC BLOOD PRESSURE: 130 MMHG

## 2023-03-03 VITALS — SYSTOLIC BLOOD PRESSURE: 110 MMHG | DIASTOLIC BLOOD PRESSURE: 52 MMHG

## 2023-03-03 VITALS — SYSTOLIC BLOOD PRESSURE: 124 MMHG | DIASTOLIC BLOOD PRESSURE: 66 MMHG

## 2023-03-03 VITALS — DIASTOLIC BLOOD PRESSURE: 70 MMHG | SYSTOLIC BLOOD PRESSURE: 118 MMHG

## 2023-03-03 VITALS — DIASTOLIC BLOOD PRESSURE: 112 MMHG | SYSTOLIC BLOOD PRESSURE: 158 MMHG

## 2023-03-03 VITALS — DIASTOLIC BLOOD PRESSURE: 64 MMHG | SYSTOLIC BLOOD PRESSURE: 125 MMHG

## 2023-03-03 VITALS — SYSTOLIC BLOOD PRESSURE: 135 MMHG | DIASTOLIC BLOOD PRESSURE: 57 MMHG

## 2023-03-03 LAB
BUN SERPL-MCNC: 12 MG/DL (ref 7–18)
CALCIUM SERPL-MCNC: 7.8 MG/DL (ref 8.5–10.1)
CHLORIDE SERPL-SCNC: 104 MMOL/L (ref 98–107)
CO2 SERPL-SCNC: 32 MMOL/L (ref 21–32)
CREAT SERPL-MCNC: 0.6 MG/DL (ref 0.6–1.3)
GLUCOSE SERPL-MCNC: 100 MG/DL (ref 74–106)
POTASSIUM SERPL-SCNC: 4 MMOL/L (ref 3.5–5.1)
SODIUM SERPL-SCNC: 139 MMOL/L (ref 136–145)

## 2023-03-03 RX ADMIN — Medication SCH EACH: at 17:35

## 2023-03-03 RX ADMIN — CARVEDILOL SCH MG: 12.5 TABLET, FILM COATED ORAL at 08:03

## 2023-03-03 RX ADMIN — Medication SCH ML: at 17:39

## 2023-03-03 RX ADMIN — Medication SCH EACH: at 07:24

## 2023-03-03 RX ADMIN — Medication SCH EACH: at 12:54

## 2023-03-03 RX ADMIN — INSULIN HUMAN PRN UNITS: 100 INJECTION, SOLUTION PARENTERAL at 12:54

## 2023-03-03 RX ADMIN — INSULIN HUMAN PRN UNITS: 100 INJECTION, SOLUTION PARENTERAL at 17:36

## 2023-03-03 RX ADMIN — ASPIRIN 81 MG SCH MG: 81 TABLET ORAL at 10:37

## 2023-03-03 RX ADMIN — Medication SCH EACH: at 21:32

## 2023-03-03 RX ADMIN — ZOLPIDEM TARTRATE PRN MG: 5 TABLET, FILM COATED ORAL at 21:01

## 2023-03-03 RX ADMIN — INSULIN HUMAN PRN UNITS: 100 INJECTION, SOLUTION PARENTERAL at 23:04

## 2023-03-03 RX ADMIN — EMPAGLIFLOZIN SCH MG: 25 TABLET, FILM COATED ORAL at 08:03

## 2023-03-03 RX ADMIN — CARVEDILOL SCH MG: 12.5 TABLET, FILM COATED ORAL at 21:32

## 2023-03-03 NOTE — NUR
RN NOTES- AMBIEN



PATIENT ASKED FOR SLEEPING PILL INSTEAD OF PAIN PILL. AMBIEN 5MG GIVEN PRN. WILL CONTINUE TO 
MONITOR PATIENT.

## 2023-03-03 NOTE — NUR
TELE RN OPENING NOTES



RECEIVED PATIENT AWAKE IN BED RESTING, A/O X 3-4. NO S/S OF PAIN NOTED AT THIS TIME. ON 2L 
OXYGEN VIA NC, BREATHING EVEN UNLABORED, NO DISTRESS OR SHORTNESS OF BREATH NOTED AT THIS 
TIME. IV ACCESS SHANNAN MIDLINE, INTACT PATENT AND FLUSHING WELL. PATIENT WITH EXTERNAL CARDIAC 
MONITOR WITH CURRENT READING OF A-FIB CONTROLLED AND HR OF 90, NO CARDIAC DISTRESS NOTED. ON 
ORTIZ CATH DRAINING YELLOW COLORED URINE. FALL AND SAFETY MEASURES IN PLACE, BED ALARM ON, 
BED IN LOW AND LOCK POSITION, CALL LIGHT AND TABLE WITHIN EASY REACH, SIDE RAILS UP X2. WILL 
CONTINUE TO MONITOR.

## 2023-03-03 NOTE — NUR
TELE RN CLOSING NOTE:



PATIENT AWAKE AND ALERT. A/O X 4. ON OXYGEN @ 2 LPM VIA NC.  BREATHING IS EVEN AND 
UNLABORED. NO DISTRESS OR SHORTNESS OF BREATH NOTED AT THIS TIME. IV ACCESS SHANNAN MIDLINE, 
INTACT PATENT AND FLUSHING WELL. PATIENT WITH EXTERNAL CARDIAC MONITOR WITH CURRENT READING 
OF A-FIB CONTROLLED WITH HR FLUCTUATING IN 80S TO 100S.  NO CARDIAC DISTRESS NOTED. ALL DUE 
MEDS GIVEN. FALL AND SAFETY MEASURES MAINTAINED, BED ALARM ON, BED IN LOW AND LOCK POSITION, 
CALL LIGHT AND TABLE WITHIN EASY REACH, SIDE RAILS UP X2. WILL ENDORSE TO NIGHT SHIFT RNROBERT, FOR ADILIA.

## 2023-03-03 NOTE — NUR
Patient arrived as transfer from ICU, bed 254, via bed, alert and oriented x 4,calm, denying 
pain,no SOB on oxygen via nasal cannula @ 2 LPM.  Vital signs stable.  Afebrile. Safety 
precautions in place : bed alarm on; bed brakes locked on; bed in lowest position; bed side 
rails up x 3; and call bell/light within pt's reach. Will continue to care for and monitor 
patient per hospitalist POC.

## 2023-03-03 NOTE — NUR
RN NOTE



Assumed care, patient tolerating 3L via NC, saturation at 97%, afib on the monitor HR is 83, 
awaiting CXR result. will cont to monitor.

## 2023-03-04 VITALS — DIASTOLIC BLOOD PRESSURE: 59 MMHG | SYSTOLIC BLOOD PRESSURE: 122 MMHG

## 2023-03-04 VITALS — DIASTOLIC BLOOD PRESSURE: 58 MMHG | SYSTOLIC BLOOD PRESSURE: 106 MMHG

## 2023-03-04 VITALS — DIASTOLIC BLOOD PRESSURE: 58 MMHG | SYSTOLIC BLOOD PRESSURE: 109 MMHG

## 2023-03-04 VITALS — DIASTOLIC BLOOD PRESSURE: 58 MMHG | SYSTOLIC BLOOD PRESSURE: 112 MMHG

## 2023-03-04 VITALS — SYSTOLIC BLOOD PRESSURE: 104 MMHG | DIASTOLIC BLOOD PRESSURE: 58 MMHG

## 2023-03-04 LAB
BASOPHILS # BLD AUTO: 0 K/UL (ref 0–0.2)
BASOPHILS NFR BLD AUTO: 0.5 % (ref 0–2)
BUN SERPL-MCNC: 12 MG/DL (ref 7–18)
CALCIUM SERPL-MCNC: 7.8 MG/DL (ref 8.5–10.1)
CHLORIDE SERPL-SCNC: 107 MMOL/L (ref 98–107)
CO2 SERPL-SCNC: 36 MMOL/L (ref 21–32)
CREAT SERPL-MCNC: 0.6 MG/DL (ref 0.6–1.3)
EOSINOPHIL NFR BLD AUTO: 2.1 % (ref 0–6)
GLUCOSE SERPL-MCNC: 94 MG/DL (ref 74–106)
HCT VFR BLD AUTO: 28 % (ref 33–45)
HGB BLD-MCNC: 8.5 G/DL (ref 11.5–14.8)
LYMPHOCYTES NFR BLD AUTO: 0.7 K/UL (ref 0.8–4.8)
LYMPHOCYTES NFR BLD AUTO: 18.6 % (ref 20–44)
MCHC RBC AUTO-ENTMCNC: 30 G/DL (ref 31–36)
MCV RBC AUTO: 69 FL (ref 82–100)
MONOCYTES NFR BLD AUTO: 0.5 K/UL (ref 0.1–1.3)
MONOCYTES NFR BLD AUTO: 12.6 % (ref 2–12)
NEUTROPHILS # BLD AUTO: 2.5 K/UL (ref 1.8–8.9)
NEUTROPHILS NFR BLD AUTO: 66.2 % (ref 43–81)
PLATELET # BLD AUTO: 478 K/UL (ref 150–450)
POTASSIUM SERPL-SCNC: 4 MMOL/L (ref 3.5–5.1)
RBC # BLD AUTO: 4.14 MIL/UL (ref 4–5.2)
SODIUM SERPL-SCNC: 144 MMOL/L (ref 136–145)
WBC NRBC COR # BLD AUTO: 3.8 K/UL (ref 4.3–11)

## 2023-03-04 RX ADMIN — Medication SCH ML: at 08:37

## 2023-03-04 RX ADMIN — INSULIN HUMAN PRN UNITS: 100 INJECTION, SOLUTION PARENTERAL at 12:06

## 2023-03-04 RX ADMIN — INSULIN HUMAN PRN UNITS: 100 INJECTION, SOLUTION PARENTERAL at 17:43

## 2023-03-04 RX ADMIN — ZOLPIDEM TARTRATE PRN MG: 5 TABLET, FILM COATED ORAL at 22:18

## 2023-03-04 RX ADMIN — Medication PRN MG: at 03:43

## 2023-03-04 RX ADMIN — Medication SCH EACH: at 22:30

## 2023-03-04 RX ADMIN — Medication SCH EACH: at 17:43

## 2023-03-04 RX ADMIN — ALBUTEROL SULFATE PRN MG: 2.5 SOLUTION RESPIRATORY (INHALATION) at 03:43

## 2023-03-04 RX ADMIN — CARVEDILOL SCH MG: 12.5 TABLET, FILM COATED ORAL at 21:10

## 2023-03-04 RX ADMIN — Medication SCH EACH: at 12:07

## 2023-03-04 RX ADMIN — EMPAGLIFLOZIN SCH MG: 25 TABLET, FILM COATED ORAL at 08:38

## 2023-03-04 RX ADMIN — Medication SCH ML: at 17:18

## 2023-03-04 RX ADMIN — Medication SCH EACH: at 08:37

## 2023-03-04 RX ADMIN — CARVEDILOL SCH MG: 12.5 TABLET, FILM COATED ORAL at 08:37

## 2023-03-04 NOTE — NUR
RN OPENING NOTES



RECEIVED PATIENT IN BED AWAKE. A/O X 2-3. PATIENT WITH MID-LINE  ON LEFT UPPER ARM; PATENT 
AND INTACT. PATIENT WITH ORTIZ CATHETER; ORTIZ WAS ASSESSED; DRAINING YELLOW URINE AND NO 
REDNESS OR SWELLING ON THE SITE. PATIENT IS IN BED COMFORTABLY NO SIGNS OF DISTRESS AND NO 
PAIN AS OF THE MOMENT AS PER PATIENT. SAFETY MEASURES IN PLACED; BED LOCKED AND IN LOWEST 
POSITION, CALL LIGHT AND BEDSIDE TABLE WITHIN PATIENTS REACH.

## 2023-03-04 NOTE — NUR
RN OPENING NOTE



RECEIVED PATIENT IN BED AO x 3-4 Mauritian SPEAKING, ABLE TO RESPONDS PHYSICAL STIMULI. 
RESPIRATORY EVEN AND UNLABORED IN OXYGEN AT 2 Ls VIA NC. IN NO ACUTE DISTRESS OBSERVED. SKIN 
IS WARM TO TOUCH, KEEP CLEAN/DRY. KEPT ELEVATED HOB FOR ASPIRATION PRECAUTION/ENSURE AIRWAY, 
ALSO LOWEST BED POSITIONED. BED ALARM IS ON AT ALL THE TIME FOR SAFETY. CALL LIGHT WITHIN 
REACH, WILL CONTINUE TO MONITOR.

## 2023-03-04 NOTE — NUR
THE PATIENT WAS ASSISTED TO THE BATHROOM FOR BM AND BACK INTO BED.                           
                                                                                             
                                                                                             
                                                                                             
  THE PATIENT STARTS TO COMPLAIN TO THE PRIMARY NURSE DUE TO TOUCHED THE PATENT'S FOOT 
ACCIDENTALLY DURING FIXING ORTIZ CATHETER. APOLOGIZED AND ASKED THE PATIENT IF SHE WANTS 
PAIN MEDICINE BUT SHE REFUSED. REMAIN MONITORED THE PATIENT'S FOR SAFETY. THE DAUGHTER CAME 
THIS AFTERNOON AND SAID "YOU TOUCHED MY MOTHER'S FOOT INTENTIONALLY, YOU ARE BAD BEHAVIOR, 
I'LL REPORT HEAD OF QUATER ABOUT YOU, YOU SHOULD EMPTY THE BAG (URIN BAG)". REPORTED CHARGE 
NURSE REGARDING ABOVE THE ISSUES.

## 2023-03-04 NOTE — NUR
THE PATIENT WAS ASSISTED TO THE BATHROOM FOR BM AND BACK INTO BED.                           
                                                                                             
                                                                                             
                                                                                             
  THE PATIENT STARTS TO COMPLAIN TO THE PRIMARY NURSE DUE TO TOUCHED THE PATENT'S FOOT 
ACCIDENTALLY DURING FIXING ORTIZ CATHETER. APOLOGIZED AND ASKED THE PATIENT IF SHE WANTS 
PATIENT MEDICINE BUT SHE REFUSED. REMAIN MONITORED THE PATIENT'S FOR SAFETY. THE DAUGHTER 
CAME THIS AFTERNOON AND SAID "YOU TOUCHED MY MOTHER'S FOOT INTENTIONALLY, ARE BAD BEHAVIOR, 
I'LL REPORT HEAD OF QUATER ABOUT YOU, YOU SHOULD EMPTY THE BAG (URIN BAG)". REPORTED CHARGE 
NURSE REGARDING ABOVE THE ISSUES.                           

-------------------------------------------------------------------------------

Addendum: 03/04/23 at 1901 by ANGELINE GONZALEZ RN

-------------------------------------------------------------------------------

ASHLEY YEPEZ

## 2023-03-04 NOTE — NUR
RN MS NOTES

RECEIVED REPORT FROM CAMRYN LOZANO FOR CONTINUITY OF CARE, PT AWAKE, SITTING IN HER CHAIR, 
DAUGHTER AT BEDSIDE, CALL LIGHT WITHIN REACH.

## 2023-03-04 NOTE — NUR
RN NOTES



PATIENT WAS HAVING SOB AND AUDIBLE WHEEZING. CHEST XRAY ORDERED STAT AND BREATHING TREATMENT 
ORDERED AS PER DR. MENESES. HOB ELEVATED. WILL CONTINUE TO MONITOR PATIENT.

## 2023-03-04 NOTE — NUR
RN MS NOTES

PT IN BED, AWAKE, ALERT AND ORIENTED, DENIES PAIN, NOT IN DISTRESS, CALL LIGHT WITHIN REACH, 
ATE DINNER, BLOOD SUGAR CHECKED DONE, F/C IN PLACE, DRAINING WELL WITH CLEAR, YELLOW URINE, 
ALL NEEDS ATTENDED.

## 2023-03-04 NOTE — NUR
TELE RN CLOSING NOTES



PATIENT AWAKE AND ALERT. A/O X 4. ON 2L OXYGEN VIA NC, NO DISTRESS OR SHORTNESS OF BREATH 
NOTED AT THIS TIME. IV ACCESS SHANNAN MIDLINE, INTACT PATENT AND FLUSHING WELL. PATIENT WITH 
EXTERNAL CARDIAC MONITOR WITH CURRENT READING OF A-FIB CONTROLLED AND HR OF 90, NO CARDIAC 
DISTRESS NOTED. ALL DUE IV MEDS GIVEN.  FALL AND SAFETY MEASURES MAINTAINED, BED ALARM ON, 
BED IN LOW AND LOCK POSITION, CALL LIGHT AND TABLE WITHIN EASY REACH, SIDE RAILS UP X2. WILL 
ENDORSE TO THE NEXT SHIFT.

## 2023-03-05 VITALS — DIASTOLIC BLOOD PRESSURE: 74 MMHG | SYSTOLIC BLOOD PRESSURE: 124 MMHG

## 2023-03-05 VITALS — SYSTOLIC BLOOD PRESSURE: 115 MMHG | DIASTOLIC BLOOD PRESSURE: 63 MMHG

## 2023-03-05 LAB
BASOPHILS # BLD AUTO: 0.1 K/UL (ref 0–0.2)
BASOPHILS NFR BLD AUTO: 1.1 % (ref 0–2)
BUN SERPL-MCNC: 16 MG/DL (ref 7–18)
CALCIUM SERPL-MCNC: 8.4 MG/DL (ref 8.5–10.1)
CHLORIDE SERPL-SCNC: 107 MMOL/L (ref 98–107)
CO2 SERPL-SCNC: 31 MMOL/L (ref 21–32)
CREAT SERPL-MCNC: 0.5 MG/DL (ref 0.6–1.3)
EOSINOPHIL NFR BLD AUTO: 1.6 % (ref 0–6)
GLUCOSE SERPL-MCNC: 123 MG/DL (ref 74–106)
HCT VFR BLD AUTO: 30 % (ref 33–45)
HGB BLD-MCNC: 9 G/DL (ref 11.5–14.8)
LYMPHOCYTES NFR BLD AUTO: 0.6 K/UL (ref 0.8–4.8)
LYMPHOCYTES NFR BLD AUTO: 12.3 % (ref 20–44)
MCHC RBC AUTO-ENTMCNC: 30 G/DL (ref 31–36)
MCV RBC AUTO: 69 FL (ref 82–100)
MONOCYTES NFR BLD AUTO: 0.6 K/UL (ref 0.1–1.3)
MONOCYTES NFR BLD AUTO: 11 % (ref 2–12)
NEUTROPHILS # BLD AUTO: 3.8 K/UL (ref 1.8–8.9)
NEUTROPHILS NFR BLD AUTO: 74 % (ref 43–81)
PLATELET # BLD AUTO: 429 K/UL (ref 150–450)
POTASSIUM SERPL-SCNC: 4.3 MMOL/L (ref 3.5–5.1)
RBC # BLD AUTO: 4.34 MIL/UL (ref 4–5.2)
SODIUM SERPL-SCNC: 143 MMOL/L (ref 136–145)
WBC NRBC COR # BLD AUTO: 5.1 K/UL (ref 4.3–11)

## 2023-03-05 RX ADMIN — Medication PRN MG: at 22:27

## 2023-03-05 RX ADMIN — ALBUTEROL SULFATE PRN MG: 2.5 SOLUTION RESPIRATORY (INHALATION) at 22:27

## 2023-03-05 RX ADMIN — CARVEDILOL SCH MG: 12.5 TABLET, FILM COATED ORAL at 09:35

## 2023-03-05 RX ADMIN — Medication SCH EACH: at 21:47

## 2023-03-05 RX ADMIN — Medication SCH EACH: at 16:54

## 2023-03-05 RX ADMIN — INSULIN HUMAN PRN UNITS: 100 INJECTION, SOLUTION PARENTERAL at 22:24

## 2023-03-05 RX ADMIN — ZOLPIDEM TARTRATE PRN MG: 5 TABLET, FILM COATED ORAL at 20:30

## 2023-03-05 RX ADMIN — Medication SCH EACH: at 11:31

## 2023-03-05 RX ADMIN — Medication SCH EACH: at 06:52

## 2023-03-05 RX ADMIN — EMPAGLIFLOZIN SCH MG: 25 TABLET, FILM COATED ORAL at 09:35

## 2023-03-05 RX ADMIN — Medication SCH ML: at 16:54

## 2023-03-05 RX ADMIN — Medication SCH ML: at 08:01

## 2023-03-05 RX ADMIN — INSULIN HUMAN PRN UNITS: 100 INJECTION, SOLUTION PARENTERAL at 11:23

## 2023-03-05 RX ADMIN — CARVEDILOL SCH MG: 12.5 TABLET, FILM COATED ORAL at 20:30

## 2023-03-05 NOTE — NUR
MS RN CLOSING NOTE



Patient in bed, resting. A/O x 2-3, Kiswahili speaking. On O2 at 2 LPM via NC, breathing 
evenly and unlabored. No SOB or s/s of distress noted. IV access on SHANNAN midline SL, intact 
and patent. Smith catheter in place draining to a yellow colored urine. All needs attended 
to. Due meds given. Wound care done on Right great toe. Safety precautions in place: bed in 
low, locked position; siderails up x 2; call light within reach. Will endorse to night shift 
nurse for ADILIA.

## 2023-03-05 NOTE — NUR
MS RN OPENING NOTE



Patient in bed, awake. A/O x 2-3, Congolese speaking. On O2 at 2 LPM via NC, breathing evenly 
and unlabored. No SOB or s/s of distress noted. IV access on SHANNAN midline SL, intact and 
patent. Smith catheter in place draining to a yellow colored urine. Safety precautions in 
place: bed in low, locked position; siderails up x 2; call light within reach. Will continue 
to monitor.

## 2023-03-05 NOTE — NUR
RT NADINE ON BEDSIDE AT 2210. RT GIVEN BREATHING TREATMENT AND REPORTED TO RN THAT PT IS 
BREATHING WELL AFTER TREATMENT AND O2 SAT REMAINS AT 96%. PT IS ON NASAL CANULA 2L O2.

## 2023-03-05 NOTE — NUR
RN CLOSING NOTES



PATIENT IN BED AWAKE. A/O X 2-3. PATIENT WITH MID-LINE  ON LEFT UPPER ARM; PATENT AND 
INTACT. PATIENT WITH ORTIZ CATHETER; ORTIZ WAS ASSESSED; DRAINING YELLOW URINE AND NO 
REDNESS OR SWELLING ON THE SITE. PATIENT IS IN BED COMFORTABLY NO SIGNS OF DISTRESS AND NO 
PAIN AS OF THE MOMENT AS PER PATIENT. ALL DUE MEDICATIONS ARE GIVEN, ALL NEEDS ARE MET. 
SAFETY MEASURES IN PLACED; BED LOCKED AND IN LOWEST POSITION, CALL LIGHT AND BEDSIDE TABLE 
WITHIN PATIENTS REACH. WILL ENDORSE TO NEXT SHIFT FOR CONTINUITY OF CARE.

## 2023-03-05 NOTE — NUR
RN OPENING NOTES



PATIENT IS AWAKE, A/O X 2-3, Greenlandic SPEAKING. PT ABLE TO MAKE NEEDS KNOWN.PT IN NASAL 
CANULA OF 3L O2, TOLERATING WELL. BREATHING EVEN AND UNLABORED AT THIS TIME. PT COMPLAINING 
OF DISCOMFORT FROM SITTING. PT IV ACCESS ON LEFT UPPER ARM MIDLINE, PATENT, INTACT AND 
FLUSHES WELL WITH NO S/S OF INFILTRATION. ON IV SITE. PT IS ON ORTIZ CATHETER IN PLACE 
DRAINING YELLOW URINE. PT WALKS TO THE BATHROOM IN A WALKER WITH ASSIST FOR BM. SAFETY 
MEASURES IS IN PLACE. BED PLACED IN LOWEST AND LOCKED POSITION, SIDE RAILS UP BY 2X, BEDSIDE 
TABLE AND CALL LIGHT IS EASY REACH. BED ALARM IS ON. WILL CONTINUE TO MONITOR PT 
ACCORDINGLY.

## 2023-03-06 VITALS — DIASTOLIC BLOOD PRESSURE: 63 MMHG | SYSTOLIC BLOOD PRESSURE: 115 MMHG

## 2023-03-06 VITALS — SYSTOLIC BLOOD PRESSURE: 146 MMHG | DIASTOLIC BLOOD PRESSURE: 60 MMHG

## 2023-03-06 LAB
BASOPHILS # BLD AUTO: 0 K/UL (ref 0–0.2)
BASOPHILS NFR BLD AUTO: 0.7 % (ref 0–2)
BUN SERPL-MCNC: 14 MG/DL (ref 7–18)
CALCIUM SERPL-MCNC: 8.1 MG/DL (ref 8.5–10.1)
CHLORIDE SERPL-SCNC: 105 MMOL/L (ref 98–107)
CO2 SERPL-SCNC: 36 MMOL/L (ref 21–32)
CREAT SERPL-MCNC: 0.5 MG/DL (ref 0.6–1.3)
EOSINOPHIL NFR BLD AUTO: 1.5 % (ref 0–6)
GLUCOSE SERPL-MCNC: 129 MG/DL (ref 74–106)
HCT VFR BLD AUTO: 30 % (ref 33–45)
HGB BLD-MCNC: 8.9 G/DL (ref 11.5–14.8)
LYMPHOCYTES NFR BLD AUTO: 0.6 K/UL (ref 0.8–4.8)
LYMPHOCYTES NFR BLD AUTO: 10.7 % (ref 20–44)
MCHC RBC AUTO-ENTMCNC: 30 G/DL (ref 31–36)
MCV RBC AUTO: 69 FL (ref 82–100)
MONOCYTES NFR BLD AUTO: 0.6 K/UL (ref 0.1–1.3)
MONOCYTES NFR BLD AUTO: 11.1 % (ref 2–12)
NEUTROPHILS # BLD AUTO: 4.2 K/UL (ref 1.8–8.9)
NEUTROPHILS NFR BLD AUTO: 76 % (ref 43–81)
PLATELET # BLD AUTO: 449 K/UL (ref 150–450)
POTASSIUM SERPL-SCNC: 4.2 MMOL/L (ref 3.5–5.1)
RBC # BLD AUTO: 4.34 MIL/UL (ref 4–5.2)
SODIUM SERPL-SCNC: 142 MMOL/L (ref 136–145)
WBC NRBC COR # BLD AUTO: 5.5 K/UL (ref 4.3–11)

## 2023-03-06 PROCEDURE — 0Y6P0Z1 DETACHMENT AT RIGHT 1ST TOE, HIGH, OPEN APPROACH: ICD-10-PCS | Performed by: PODIATRIST

## 2023-03-06 RX ADMIN — CARVEDILOL SCH MG: 12.5 TABLET, FILM COATED ORAL at 09:00

## 2023-03-06 RX ADMIN — Medication SCH ML: at 08:00

## 2023-03-06 RX ADMIN — Medication SCH EACH: at 06:35

## 2023-03-06 RX ADMIN — EMPAGLIFLOZIN SCH MG: 25 TABLET, FILM COATED ORAL at 09:00

## 2023-03-06 RX ADMIN — Medication SCH EACH: at 12:00

## 2023-03-06 RX ADMIN — ASPIRIN 81 MG SCH MG: 81 TABLET ORAL at 11:30

## 2023-03-06 NOTE — NUR
RN CLOSING NOTES



PATIENT IS AWAKE, A/O X 2-3, Japanese SPEAKING. PT ABLE TO MAKE NEEDS KNOWN.PT IN NASAL 
CANULA OF 2L O2, TOLERATING WELL. BREATHING EVEN AND UNLABORED AT THIS TIME. PT IV ACCESS ON 
LEFT UPPER ARM MIDLINE, PATENT, INTACT AND FLUSHES WELL WITH NO S/S OF INFLITRATION. ON IV 
SITE. PT IS ON ORTIZ CATHETER IN PLACE DRAINING YELLOW URINE. PT WALKS TO THE BATHROOM IN A 
WALKER WITH ASSIST FOR BM. MEDICATION ADMINISTERED ACCORDINGLY. SAFETY MEASURES IS IN PLACE. 
BED PLACED IN LOWEST AND LOCKED POSITION, SIDE RAILS UP BY 2X, BEDSIDE TABLE AND CALL LIGHT 
IS EASY REACH. BED ALARM IS ON.  WILL ENDORSE TO THE NEXT SHIFT FOR CONTINUITY OF CARE.

## 2023-03-06 NOTE — NUR
[employer] called to notify and  report expiration.



Okay to release body per "[employer]"; Case#[id number]

## 2023-03-06 NOTE — NUR
1240 Code Blue #3 initiated. (see Code Blue Sheet)

1243 ROSC 



Family decided on DNR status at this time

## 2023-03-06 NOTE — NUR
Office Update:



Left message with the "urgent investigation inquiry" office to call back Dr Valle back 
at [phone] for "surgical clearance"

## 2023-03-06 NOTE — NUR
MS RN OPENING NOTE



Patient in bed, awake. A/O x 2-3, Comoran speaking. On O2 at 2 LPM via NC, breathing evenly 
and unlabored. No SOB or s/s of distress noted. IV access on SHANNAN midline SL, intact and 
patent. Smith catheter in place draining to a yellow colored urine. Patient kept NPO for 
surgery today. Safety precautions in place: bed in low, locked position; siderails up x 2; 
call light within reach. Will continue to monitor.

## 2023-03-06 NOTE — NUR
ICU/RN

CODE BLUE CALLED IN OR. PT IS POST OP ASYSTOLE ON MONITOR .CPR PROVIDED.EPI IV GIVEN , SEE 
CODE BLUE RECORD. SUCCESSFUL.PT TRANSFER TO ICU.

## 2023-03-06 NOTE — NUR
ICU/RN

PT POST OP CODED 3 TIMES.FAMILY AT BED SIDE.OK TO PUT PT DNR. MD NOTIFIED CODE STATUS 
CHANGED.

## 2023-03-06 NOTE — NUR
RT

PATIENT BROUGHT FROM OR POST OP. ORALLY INTUBATED. AS PATIENT ARRIVED TO ICU ANKIT [facility]AVEZ WAS 
CALLED. CPR INITIATED IMMEDIATELY WITH ROSC OBTAINED. PATIENT PLACED ON [facility] VENT WITH 
ORDERED SETTINGS AC 16, 500, 100% +0. ALARMS CHECKED AND AUDIBLE. AMBU BAG AT BEDSIDE. 
PATIENT IN CRITICAL CONDITION.  

-------------------------------------------------------------------------------

Addendum: 03/06/23 at 1251 by RIMMA PATEL RT

-------------------------------------------------------------------------------

Amended: Links added.

## 2023-03-06 NOTE — NUR
RN NOTE



Patient's BP was 146/60, HR 90. Patient is for surgery today at 1100. Verified with 
anesthesiologist about giving BP medication since patient is NPO. Per anesthesiologist, hold 
BP medication for now, if BP goes higher they can give it at the OR.

## 2023-03-06 NOTE — NUR
Pt Post Op Wound Debridement:



Code Blue called upon arrival to ICU#[id number]

Pt pulseless. (See code Blue Sheet)

Will follow up post op management orders

## 2023-03-06 NOTE — NUR
's Office Called [phone]

spoke to Marjorie Newell @ 's Office;



Instructed to notify surgeon who performed surgery (Dexter Herrera)  call the 's 
office surgical clearance line.

to provide pertinent pt information pertaining to case



called and spoke to surgeon's office, who will relay message to MD ASAP.